# Patient Record
Sex: FEMALE
[De-identification: names, ages, dates, MRNs, and addresses within clinical notes are randomized per-mention and may not be internally consistent; named-entity substitution may affect disease eponyms.]

---

## 2019-10-01 ENCOUNTER — HOSPITAL ENCOUNTER (EMERGENCY)
Dept: HOSPITAL 5 - ED | Age: 44
Discharge: HOME | End: 2019-10-01
Payer: SELF-PAY

## 2019-10-01 VITALS — SYSTOLIC BLOOD PRESSURE: 156 MMHG | DIASTOLIC BLOOD PRESSURE: 77 MMHG

## 2019-10-01 DIAGNOSIS — Z79.1: ICD-10-CM

## 2019-10-01 DIAGNOSIS — M25.531: Primary | ICD-10-CM

## 2019-10-01 DIAGNOSIS — M79.642: ICD-10-CM

## 2019-10-01 DIAGNOSIS — E11.9: ICD-10-CM

## 2019-10-01 DIAGNOSIS — M79.641: ICD-10-CM

## 2019-10-01 NOTE — XRAY REPORT
BILATERAL HANDS, 3 VIEWS



INDICATION:  Bilateral hand pain after fall, pain to right and left fifth digits and right wrist.. 



COMPARISON: None.



IMPRESSION:  No acute osseous or soft tissue abnormality.    No significant DJD.



Signer Name: Shadi Gamez Jr, MD 

Signed: 10/1/2019 3:46 PM

 Workstation Name: [id number]

## 2019-10-01 NOTE — EMERGENCY DEPARTMENT REPORT
ED Upper Extremity Inj HPI





- General


Chief Complaint: Extremity Injury, Upper


Stated Complaint: WRIST/FINGER PAIN


Time Seen by Provider: 10/01/19 15:10


Source: patient


Mode of arrival: Ambulatory


Limitations: Language Barrier





- History of Present Illness


Initial Comments: 





Patient reports she tripped and fell over lumber in the yard four days ago and 

injured her hands. She reports pain and swelling in both pinky fingers.


MD Complaint: Injury to:: left, right, wrist (right), hand


Onset/Timin


-: days(s)


Other Extremity Injury: Fingers: Left, Right, Wrist: Right


Other Injuries: none


Handedness: right


Place: home


Severity scale (0 -10): 5


Improves With: rest


Worsens With: movement of extremity


Context: fall


Associated Symptoms: denies other symptoms.  denies: weakness, numbness, neck 

pain, suspects foreign body, nausea/vomiting, heard/felt popping sensat


Treatments Prior to Arrival: other (none)





- Related Data


                                  Previous Rx's











 Medication  Instructions  Recorded  Last Taken  Type


 


Ibuprofen [Motrin 800 MG tab] 800 mg PO Q8HR PRN #30 tablet 10/01/19 Unknown Rx











                                    Allergies











Allergy/AdvReac Type Severity Reaction Status Date / Time


 


Insulins Allergy  Unknown Verified 10/01/19 13:33














ED Review of Systems


ROS: 


Stated complaint: WRIST/FINGER PAIN


Other details as noted in HPI





Constitutional: denies: chills, fever


Eyes: denies: eye pain, eye discharge, vision change


ENT: denies: ear pain, throat pain


Respiratory: denies: cough, shortness of breath, wheezing


Cardiovascular: denies: chest pain, palpitations


Endocrine: no symptoms reported


Gastrointestinal: denies: abdominal pain, nausea, diarrhea


Genitourinary: denies: urgency, dysuria, discharge


Musculoskeletal: joint swelling (bilaterial fifth fingers), arthralgia (right 

lateral wrist).  denies: back pain


Skin: denies: rash, lesions


Neurological: denies: headache, weakness, paresthesias


Psychiatric: denies: anxiety, depression


Hematological/Lymphatic: denies: easy bleeding, easy bruising





ED Past Medical Hx





- Past Medical History


Previous Medical History?: Yes


Hx Diabetes: Yes





- Surgical History


Past Surgical History?: No





- Social History


Smoking Status: Never Smoker


Substance Use Type: None





- Medications


Home Medications: 


                                Home Medications











 Medication  Instructions  Recorded  Confirmed  Last Taken  Type


 


Ibuprofen [Motrin 800 MG tab] 800 mg PO Q8HR PRN #30 tablet 10/01/19  Unknown Rx














ED Physical Exam





- General


Limitations: Language Barrier


General appearance: alert, in no apparent distress





- Respiratory


Respiratory exam: Present: normal lung sounds bilaterally.  Absent: respiratory 

distress, wheezes, rales, rhonchi, stridor, chest wall tenderness, accessory 

muscle use, decreased breath sounds, prolonged expiratory





- Cardiovascular


Cardiovascular Exam: Present: regular rate, normal rhythm, normal heart sounds. 

Absent: systolic murmur, diastolic murmur, rubs, gallop





- Expanded Upper Extremity Exam


  ** Left


Shoulder Exam: Present: normal inspection, full ROM


Upper Arm exam: Present: normal inspection, full ROM


Elbow exam: Present: normal inspection, full ROM


Forearm Wrist exam: Present: normal inspection, full ROM


Hand Wrist exam: Present: tenderness, swelling (PIP and MCP fifth phalanx).  

Absent: full ROM, abrasion, laceration, ecchymosis, deformity, crepidus, 

dislocation, erythema, amputation, nail avulsion, subungual hematoma


Neuro motor exam: Present: wrist extension intact, thumb opposition intact, 

thumb IP flexion intact, thumb adduction intact, fingers 2-5 abduction intact


Neurosensory exam: Present: 2-point discrimination, radial nerve intact, ulnar 

nerve intact, median nerve intact


Vascular: Present: normal capillary refill, radial pulse (2+), brachial pulse 

(2+), ulnar pulse (2+).  Absent: vascular compromise, Pallo, pulse deficit radia

l art, pulse deficit ulnar art, pulse deficit brachial art





  ** Right


Shoulder Exam: Present: normal inspection, full ROM


Upper Arm exam: Present: normal inspection, full ROM


Elbow exam: Present: normal inspection, full ROM


Forearm Wrist exam: Present: full ROM, tenderness (lateral).  Absent: swelling, 

abrasion, laceration, ecchymosis, deformity, crepidus, dislocation, erythema, 

tenderness over anatomical snuff box, pain with axial thumb loading


Hand Wrist exam: Present: full ROM, tenderness (fifth PIP phalanx).  Absent: 

swelling, abrasion, laceration, ecchymosis, deformity, crepidus, dislocation, 

erythema, amputation, nail avulsion, subungual hematoma


Neuro motor exam: Present: wrist extension intact, thumb opposition intact, 

thumb adduction intact, fingers 2-5 abduction intact


Neurosensory exam: Present: 2-point discrimination, radial nerve intact, ulnar 

nerve intact, median nerve intact


Vascular: Present: normal capillary refill, radial pulse (2+), brachial pulse 

(2+), ulnar pulse (2+).  Absent: vascular compromise, Pallo, pulse deficit 

radial art, pulse deficit ulnar art, pulse deficit brachial art





- Neurological Exam


Neurological exam: Present: alert, oriented X3, CN II-XII intact, normal gait, 

reflexes normal.  Absent: motor sensory deficit





- Psychiatric


Psychiatric exam: Present: normal affect, normal mood





- Skin


Skin exam: Present: warm, dry, intact, normal color.  Absent: rash





ED Course


                                   Vital Signs











  10/01/19





  13:45


 


Temperature 98.3 F


 


Pulse Rate 78


 


Respiratory 16





Rate 


 


Blood Pressure 156/77


 


O2 Sat by Pulse 100





Oximetry 














ED Medical Decision Making





- Lab Data








                                   Vital Signs











  10/01/19





  13:45


 


Temperature 98.3 F


 


Pulse Rate 78


 


Respiratory 16





Rate 


 


Blood Pressure 156/77


 


O2 Sat by Pulse 100





Oximetry 














- Radiology Data


Radiology results: image reviewed





BILATERAL HANDS, 3 VIEWS





INDICATION: Bilateral hand pain after fall, pain to right and left fifth digits 

and right wrist..





COMPARISON: None.





IMPRESSION: No acute osseous or soft tissue abnormality. No significant DJD.





- Medical Decision Making





During the course of ED, radiology studies were ordered. The study revealed no 

acute osseous or soft tissue abnormality or significant DJD. A velcro right 

wrist splint without spica was placed on the right wrist for comfort. Patient 

was sent home with a prescription for Ibuprofen, instructed to follow up with 

the selective referral given at discharge, she verbalized understanding.





- Differential Diagnosis


Bilaterial Hand Pain, Right Wrist Pain, Fractures


Critical care attestation.: 


If time is entered above; I have spent that time in minutes in the direct care 

of this critically ill patient, excluding procedure time.








ED Disposition


Clinical Impression: 


 Bilateral hand pain, Right wrist pain





Disposition: - TO HOME OR SELFCARE


Is pt being admited?: No


Does the pt Need Aspirin: No


Condition: Stable


Instructions:  Wrist Injury (ED)


Additional Instructions: 


Take medication as directed. Follow up with the selective referral given at 

discharge. Wear the wrist splint for comfort


Prescriptions: 


Ibuprofen [Motrin 800 MG tab] 800 mg PO Q8HR PRN #30 tablet


 PRN Reason: Pain, Moderate (4-6)


Referrals: 


CLAUDIA WOLFF MD [Staff Physician] - 3-5 Days


Forms:  Work/School Release Form(ED)


Time of Disposition: 16:54